# Patient Record
Sex: MALE | NOT HISPANIC OR LATINO | Employment: UNEMPLOYED | URBAN - METROPOLITAN AREA
[De-identification: names, ages, dates, MRNs, and addresses within clinical notes are randomized per-mention and may not be internally consistent; named-entity substitution may affect disease eponyms.]

---

## 2021-04-07 ENCOUNTER — IMMUNIZATION (OUTPATIENT)
Dept: VACCINE CLINIC | Facility: HOSPITAL | Age: 26
End: 2021-04-07

## 2021-04-07 PROCEDURE — 0001A: CPT | Performed by: OBSTETRICS & GYNECOLOGY

## 2021-04-07 PROCEDURE — 91300 HC SARSCOV02 VAC 30MCG/0.3ML IM: CPT | Performed by: OBSTETRICS & GYNECOLOGY

## 2021-04-28 ENCOUNTER — IMMUNIZATION (OUTPATIENT)
Dept: VACCINE CLINIC | Facility: HOSPITAL | Age: 26
End: 2021-04-28

## 2021-04-28 PROCEDURE — 0002A: CPT | Performed by: OBSTETRICS & GYNECOLOGY

## 2021-04-28 PROCEDURE — 91300 HC SARSCOV02 VAC 30MCG/0.3ML IM: CPT | Performed by: OBSTETRICS & GYNECOLOGY

## 2023-09-25 ENCOUNTER — TRANSCRIBE ORDERS (OUTPATIENT)
Dept: CT IMAGING | Facility: HOSPITAL | Age: 28
End: 2023-09-25

## 2023-09-25 DIAGNOSIS — R10.9 ABDOMINAL PAIN, ACUTE: Primary | ICD-10-CM

## 2023-10-06 ENCOUNTER — HOSPITAL ENCOUNTER (OUTPATIENT)
Dept: CT IMAGING | Facility: HOSPITAL | Age: 28
Discharge: HOME | End: 2023-10-06
Admitting: FAMILY MEDICINE
Payer: COMMERCIAL

## 2023-10-06 DIAGNOSIS — R10.9 ABDOMINAL PAIN, ACUTE: ICD-10-CM

## 2023-10-06 PROCEDURE — 74150 CT ABDOMEN W/O CONTRAST: CPT

## 2023-10-17 PROBLEM — R10.13 DYSPEPSIA: Status: ACTIVE | Noted: 2023-10-17

## 2023-10-18 ENCOUNTER — LAB (OUTPATIENT)
Dept: LAB | Facility: HOSPITAL | Age: 28
End: 2023-10-18
Payer: COMMERCIAL

## 2023-10-18 ENCOUNTER — PATIENT ROUNDING (BHMG ONLY) (OUTPATIENT)
Dept: GASTROENTEROLOGY | Facility: CLINIC | Age: 28
End: 2023-10-18
Payer: COMMERCIAL

## 2023-10-18 ENCOUNTER — OFFICE VISIT (OUTPATIENT)
Dept: GASTROENTEROLOGY | Facility: CLINIC | Age: 28
End: 2023-10-18
Payer: COMMERCIAL

## 2023-10-18 VITALS
BODY MASS INDEX: 44.1 KG/M2 | DIASTOLIC BLOOD PRESSURE: 90 MMHG | SYSTOLIC BLOOD PRESSURE: 130 MMHG | WEIGHT: 315 LBS | HEIGHT: 71 IN

## 2023-10-18 DIAGNOSIS — K58.0 IRRITABLE BOWEL SYNDROME WITH DIARRHEA: Primary | ICD-10-CM

## 2023-10-18 PROBLEM — R10.13 DYSPEPSIA: Status: RESOLVED | Noted: 2023-10-17 | Resolved: 2023-10-18

## 2023-10-18 LAB — CRP SERPL-MCNC: 1.21 MG/DL (ref 0–0.5)

## 2023-10-18 PROCEDURE — 36415 COLL VENOUS BLD VENIPUNCTURE: CPT | Performed by: STUDENT IN AN ORGANIZED HEALTH CARE EDUCATION/TRAINING PROGRAM

## 2023-10-18 PROCEDURE — 86364 TISS TRNSGLTMNASE EA IG CLAS: CPT | Performed by: STUDENT IN AN ORGANIZED HEALTH CARE EDUCATION/TRAINING PROGRAM

## 2023-10-18 PROCEDURE — 86258 DGP ANTIBODY EACH IG CLASS: CPT | Performed by: STUDENT IN AN ORGANIZED HEALTH CARE EDUCATION/TRAINING PROGRAM

## 2023-10-18 PROCEDURE — 86231 EMA EACH IG CLASS: CPT | Performed by: STUDENT IN AN ORGANIZED HEALTH CARE EDUCATION/TRAINING PROGRAM

## 2023-10-18 PROCEDURE — 82784 ASSAY IGA/IGD/IGG/IGM EACH: CPT | Performed by: STUDENT IN AN ORGANIZED HEALTH CARE EDUCATION/TRAINING PROGRAM

## 2023-10-18 PROCEDURE — 86140 C-REACTIVE PROTEIN: CPT | Performed by: STUDENT IN AN ORGANIZED HEALTH CARE EDUCATION/TRAINING PROGRAM

## 2023-10-18 NOTE — PROGRESS NOTES
Brandon Fang is a 28 y.o. male with a past medical history noted below who presents for evaluation of   Chief Complaint   Patient presents with    Abdominal Pain    Diarrhea       Subjective     # IBS-D   # Chronic Diarrhea    - Ongoing since 2017 intermittently but has become more persistent for the last 4 to 6 months. Further described as 3 to 5 loose bowel movements per day that tend to trigger by meals. He also attributes increased anxiety to the increased frequency of his bowel movements. Reports no specific food triggers.   - Associated with abdominal cramping/bloating. Reports no relief with Bentyl previously.   - Denies overt bleeding. Denies family history of IBD or celiac disease.   - Denies history of CCY or previous acute pancreatitis.   - CT A/P on 10/6 showed no acute abnormalities.               History reviewed. No pertinent past medical history.      Current Outpatient Medications:     cetirizine (zyrTEC) 10 MG tablet, Take 1 tablet by mouth Daily., Disp: , Rfl:     Lactobacillus (ACIDOPHILUS PO), Take 1 tablet by mouth Daily., Disp: , Rfl:     riFAXIMin (XIFAXAN) 550 MG tablet, Take 1 tablet by mouth Every 8 (Eight) Hours for 42 days., Disp: 42 tablet, Rfl: 2    Allergies   Allergen Reactions    Benadryl [Diphenhydramine] Other (See Comments)     Made him extremely hyper        Social History     Socioeconomic History    Marital status: Single   Tobacco Use    Smoking status: Former     Packs/day: 0.20     Years: 5.00     Additional pack years: 0.00     Total pack years: 1.00     Types: Cigarettes     Passive exposure: Never    Smokeless tobacco: Never    Tobacco comments:     N/A   Vaping Use    Vaping Use: Every day    Substances: Nicotine   Substance and Sexual Activity    Alcohol use: Yes     Comment: occassionally    Drug use: Never    Sexual activity: Defer       History reviewed. No pertinent family history.    Objective     Vitals:    10/18/23 1538   BP: 130/90          "10/18/23  1538   Weight: (!) 157 kg (345 lb 9.6 oz)     Body mass index is 48.2 kg/m².    Physical Exam  Constitutional:       Appearance: Normal appearance.   HENT:      Head: Normocephalic and atraumatic.   Eyes:      Extraocular Movements: Extraocular movements intact.      Pupils: Pupils are equal, round, and reactive to light.   Cardiovascular:      Heart sounds: Normal heart sounds.   Pulmonary:      Effort: Pulmonary effort is normal.      Breath sounds: Normal breath sounds.   Abdominal:      General: Abdomen is flat. Bowel sounds are normal. There is no distension.      Palpations: Abdomen is soft.      Tenderness: There is no abdominal tenderness.   Neurological:      Mental Status: He is alert.   Psychiatric:         Mood and Affect: Mood normal.         Behavior: Behavior normal.         No results found for: \"WBC\", \"RBC\", \"HGB\", \"HCT\", \"MCV\", \"MCH\", \"MCHC\", \"RDW\", \"RDWSD\", \"MPV\", \"PLT\", \"NEUTRORELPCT\", \"LYMPHORELPCT\", \"MONORELPCT\", \"EOSRELPCT\", \"BASORELPCT\", \"AUTOIGPER\", \"NEUTROABS\", \"LYMPHSABS\", \"MONOSABS\", \"EOSABS\", \"BASOSABS\", \"AUTOIGNUM\", \"NRBC\"    No results found for: \"GLUCOSE\", \"NA\", \"K\", \"CO2\", \"CL\", \"ANIONGAP\", \"CREATININE\", \"BUN\", \"BCR\", \"CALCIUM\", \"EGFRIFNONA\", \"ALKPHOS\", \"PROTEINTOT\", \"ALT\", \"AST\", \"BILITOT\", \"ALBUMIN\", \"GLOB\", \"LABIL2\"      Imaging Results (Last 7 Days)       ** No results found for the last 168 hours. **                   Assessment & Plan     Diagnoses and all orders for this visit:    1. Irritable bowel syndrome with diarrhea (Primary)  Assessment & Plan:  - Start 2 week course of Rifaximin 550 mg TID   - Obtain fecal calprotecin, CRP, & celiac panel to further evaluate     Orders:  -     Celiac Comprehensive Panel  -     C-reactive Protein  -     Calprotectin, Fecal - Stool, Per Rectum  -     Discontinue: riFAXIMin (XIFAXAN) 550 MG tablet; Take 1 tablet by mouth Every 8 (Eight) Hours for 42 days.  Dispense: 42 tablet; Refill: 2  -     riFAXIMin (XIFAXAN) 550 MG tablet; " Take 1 tablet by mouth Every 8 (Eight) Hours for 42 days.  Dispense: 42 tablet; Refill: 2        I have discussed the above plan with the patient.  They verbalize understanding and are in agreement with the plan.  They have been advised to contact the office for any questions, concerns, or changes related to their health.

## 2023-10-19 ENCOUNTER — LAB (OUTPATIENT)
Dept: LAB | Facility: HOSPITAL | Age: 28
End: 2023-10-19
Payer: COMMERCIAL

## 2023-10-19 PROCEDURE — 83993 ASSAY FOR CALPROTECTIN FECAL: CPT | Performed by: STUDENT IN AN ORGANIZED HEALTH CARE EDUCATION/TRAINING PROGRAM

## 2023-10-19 NOTE — ASSESSMENT & PLAN NOTE
- Start 2 week course of Rifaximin 550 mg TID   - Obtain fecal calprotecin, CRP, & celiac panel to further evaluate

## 2023-10-20 LAB
ENDOMYSIUM IGA SER QL: NEGATIVE
GLIADIN PEPTIDE IGA SER-ACNC: 7 UNITS (ref 0–19)
GLIADIN PEPTIDE IGG SER-ACNC: 2 UNITS (ref 0–19)
IGA SERPL-MCNC: 249 MG/DL (ref 90–386)
TTG IGA SER-ACNC: <2 U/ML (ref 0–3)
TTG IGG SER-ACNC: 5 U/ML (ref 0–5)

## 2023-10-23 NOTE — PROGRESS NOTES
- CRP and celiac panel obtained for chronic diarrhea.   - CRP mildly elevated.   - Celiac panel was negative.   - POC: fecal calprotectin which is more sensitive to evaluate for IBD than the CRP level. Suspect etiology is IBS-D. Started on 2 week course of Rifaximin to treat for IBS-D.

## 2023-10-24 LAB — CALPROTECTIN STL-MCNT: 8 UG/G (ref 0–120)

## 2023-11-03 NOTE — PROGRESS NOTES
[facility]  [address]  [facility] [address] [phone].    Before we get started may I verify your date of birth? 1995    I am calling to officially welcome you to our practice and ask about your recent visit. Is this a good time to talk? Yes    Tell me about your visit with us. What things went well?  Was late, but still got in to see MD.        We're always looking for ways to make our patients' experiences even better. Do you have recommendations on ways we may improve?  no    Overall were you satisfied with your first visit to our practice? yes       I appreciate you taking the time to speak with me today. Is there anything else I can do for you? no      Thank you, and have a great day.

## 2024-01-12 ENCOUNTER — OFFICE VISIT (OUTPATIENT)
Dept: GASTROENTEROLOGY | Facility: CLINIC | Age: 29
End: 2024-01-12
Payer: COMMERCIAL

## 2024-01-12 VITALS — DIASTOLIC BLOOD PRESSURE: 88 MMHG | BODY MASS INDEX: 50.93 KG/M2 | HEIGHT: 71 IN | SYSTOLIC BLOOD PRESSURE: 138 MMHG

## 2024-01-12 DIAGNOSIS — K58.0 IRRITABLE BOWEL SYNDROME WITH DIARRHEA: Primary | ICD-10-CM

## 2024-01-12 NOTE — PROGRESS NOTES
Brandon Fang is a 28 y.o. male with PMH of IBS-D who presents with   Chief Complaint   Patient presents with    Follow-up     IBS-D       Subjective     # IBS-D   - Following his last appointment in 10/2023, treated with a 2 week course of Rifaximin 550 mg TID with subsequent resolution of his diarrhea. Also reports abdominal cramping has significantly improved.   - Adherent to daily Metamucil and daily Lactobacillus.   - Had negative celiac panel and fecal calprotectin.   - Denies history of CCY or previous acute pancreatitis.   - CT A/P on 10/6 showed no acute abnormalities.             History reviewed. No pertinent past medical history.    Social History     Socioeconomic History    Marital status: Single   Tobacco Use    Smoking status: Former     Packs/day: 0.20     Years: 5.00     Additional pack years: 0.00     Total pack years: 1.00     Types: Cigarettes     Passive exposure: Never    Smokeless tobacco: Never    Tobacco comments:     N/A   Vaping Use    Vaping Use: Every day    Substances: Nicotine   Substance and Sexual Activity    Alcohol use: Yes     Comment: occassionally    Drug use: Never    Sexual activity: Defer         Current Outpatient Medications:     cetirizine (zyrTEC) 10 MG tablet, Take 1 tablet by mouth Daily., Disp: , Rfl:     Lactobacillus (ACIDOPHILUS PO), Take 1 tablet by mouth Daily., Disp: , Rfl:     Objective   Vitals:    01/12/24 1427   BP: 138/88     There were no vitals filed for this visit.  Body mass index is 50.93 kg/m².      Physical Exam  Constitutional:       Appearance: Normal appearance.   HENT:      Head: Normocephalic and atraumatic.   Eyes:      Extraocular Movements: Extraocular movements intact.      Conjunctiva/sclera: Conjunctivae normal.   Cardiovascular:      Rate and Rhythm: Normal rate and regular rhythm.      Heart sounds: Normal heart sounds.   Pulmonary:      Effort: Pulmonary effort is normal.      Breath sounds: Normal breath sounds.   Abdominal:     "  General: Abdomen is flat. Bowel sounds are normal. There is no distension.      Palpations: Abdomen is soft.      Tenderness: There is no abdominal tenderness.   Neurological:      Mental Status: He is alert.   Psychiatric:         Mood and Affect: Mood normal.         Behavior: Behavior normal.         No results found for: \"WBC\", \"RBC\", \"HGB\", \"HCT\", \"MCV\", \"MCH\", \"MCHC\", \"RDW\", \"RDWSD\", \"MPV\", \"PLT\", \"NEUTRORELPCT\", \"LYMPHORELPCT\", \"MONORELPCT\", \"EOSRELPCT\", \"BASORELPCT\", \"AUTOIGPER\", \"NEUTROABS\", \"LYMPHSABS\", \"MONOSABS\", \"EOSABS\", \"BASOSABS\", \"AUTOIGNUM\", \"NRBC\"    No results found for: \"GLUCOSE\", \"BUN\", \"CREATININE\", \"EGFRIFNONA\", \"EGFRIFAFRI\", \"BCR\", \"POTASSIUM\", \"CO2\", \"CALCIUM\", \"PROTENTOTREF\", \"ALBUMIN\", \"LABIL2\", \"BILIRUBIN\", \"AST\", \"ALT\"      Imaging Results (Last 7 Days)       ** No results found for the last 168 hours. **              Assessment & Plan   Diagnoses and all orders for this visit:    1. Irritable bowel syndrome with diarrhea (Primary)  Assessment & Plan:  - Treated with a 2 week course of Rifaximin with resolution of diarrhea   - Continue daily Metamucil and daily Lactobacillus       RTC in 6 months     I have discussed the above plan with the patient.  They verbalize understanding and are in agreement with the plan.  They have been advised to contact the office for any questions, concerns, or changes related to their health.      "

## 2024-01-12 NOTE — ASSESSMENT & PLAN NOTE
- Treated with a 2 week course of Rifaximin with resolution of diarrhea   - Continue daily Metamucil and daily Lactobacillus

## 2025-02-16 ENCOUNTER — HOSPITAL ENCOUNTER (EMERGENCY)
Facility: HOSPITAL | Age: 30
Discharge: HOME | End: 2025-02-16
Attending: STUDENT IN AN ORGANIZED HEALTH CARE EDUCATION/TRAINING PROGRAM | Admitting: STUDENT IN AN ORGANIZED HEALTH CARE EDUCATION/TRAINING PROGRAM
Payer: COMMERCIAL

## 2025-02-16 VITALS
SYSTOLIC BLOOD PRESSURE: 99 MMHG | HEART RATE: 73 BPM | BODY MASS INDEX: 36.4 KG/M2 | RESPIRATION RATE: 16 BRPM | HEIGHT: 71 IN | TEMPERATURE: 98.5 F | DIASTOLIC BLOOD PRESSURE: 70 MMHG | WEIGHT: 260 LBS | OXYGEN SATURATION: 97 %

## 2025-02-16 DIAGNOSIS — K64.4 EXTERNAL HEMORRHOID: Primary | ICD-10-CM

## 2025-02-16 PROCEDURE — 99282 EMERGENCY DEPT VISIT SF MDM: CPT | Performed by: STUDENT IN AN ORGANIZED HEALTH CARE EDUCATION/TRAINING PROGRAM

## 2025-02-16 RX ORDER — NITROGLYCERIN 4 MG/G
1 OINTMENT RECTAL 2 TIMES DAILY PRN
Qty: 30 G | Refills: 0 | Status: SHIPPED | OUTPATIENT
Start: 2025-02-16

## 2025-02-16 RX ORDER — SULFAMETHOXAZOLE AND TRIMETHOPRIM 800; 160 MG/1; MG/1
1 TABLET ORAL 2 TIMES DAILY
Qty: 10 TABLET | Refills: 0 | Status: SHIPPED | OUTPATIENT
Start: 2025-02-16 | End: 2025-02-21

## 2025-02-16 NOTE — Clinical Note
[facility] EMERGENCY DEPARTMENT  [address]  [place] [address]  Phone: [phone]    Brandon Fang was seen and treated in our emergency department on 2/16/2025.  He may return to work on 02/18/2025.         Thank you for choosing [facility].    Isma Watters MD

## 2025-02-16 NOTE — DISCHARGE INSTRUCTIONS
Continue Metamucil and consider adding MiraLAX and or Dulcolax for relief of constipation, follow-up with GI for management of external hemorrhoids

## 2025-02-16 NOTE — Clinical Note
[facility] EMERGENCY DEPARTMENT  [address]  [place] [address]  Phone: [phone]    Brandon Fang was seen and treated in our emergency department on 2/16/2025.  He may return to work on 02/18/2025.         Thank you for choosing [facility].    Isma Watters MD

## 2025-02-16 NOTE — ED PROVIDER NOTES
Subjective   History of Present Illness  29-year-old male with past medical history of external hemorrhoids has had GI follow-up to be evaluated for colitis presenting with complaint of external hemorrhoid pain.  He states that the pain has been present for 1 week, he has tried lidocaine and a anesthetic cream available over-the-counter.  Patient states that he has not had any relief.  He has not seen a doctor for this pain yet.  Fever no chills, he reports significant weight loss of approximately 100 pounds over the past year that was self-directed and intentional.  He does not have any fever, chills, any body aches, no bloody diarrhea no abdominal pain no nausea no vomiting        Review of Systems    History reviewed. No pertinent past medical history.    Allergies   Allergen Reactions    Benadryl [Diphenhydramine] Other (See Comments)     Made him extremely hyper        History reviewed. No pertinent surgical history.    History reviewed. No pertinent family history.    Social History     Socioeconomic History    Marital status: Single   Tobacco Use    Smoking status: Former     Current packs/day: 0.20     Average packs/day: 0.2 packs/day for 5.0 years (1.0 ttl pk-yrs)     Types: Cigarettes     Passive exposure: Never    Smokeless tobacco: Former    Tobacco comments:     N/A   Vaping Use    Vaping status: Former    Devices: Pre-filled or refillable cartridge   Substance and Sexual Activity    Alcohol use: Yes     Comment: occassionally    Drug use: Never    Sexual activity: Not Currently           Objective   Physical Exam  Vitals and nursing note reviewed. Exam conducted with a chaperone present.   Constitutional:       General: He is not in acute distress.     Appearance: Normal appearance. He is not ill-appearing, toxic-appearing or diaphoretic.   HENT:      Head: Normocephalic and atraumatic.      Nose: Nose normal.      Mouth/Throat:      Pharynx: No oropharyngeal exudate or posterior oropharyngeal  erythema.   Eyes:      Extraocular Movements: Extraocular movements intact.      Conjunctiva/sclera: Conjunctivae normal.      Pupils: Pupils are equal, round, and reactive to light.   Cardiovascular:      Rate and Rhythm: Normal rate and regular rhythm.   Pulmonary:      Effort: Pulmonary effort is normal. No respiratory distress.      Breath sounds: Normal breath sounds. No stridor. No wheezing, rhonchi or rales.   Abdominal:      General: Abdomen is flat. There is no distension.      Tenderness: There is no abdominal tenderness. There is no guarding or rebound.   Genitourinary:     Comments: Multiple perianal skin tags, 1 active external hemorrhoid no surrounding erythema, edema  Musculoskeletal:         General: No swelling, tenderness, deformity or signs of injury. Normal range of motion.      Cervical back: Normal range of motion.   Skin:     General: Skin is warm and dry.      Capillary Refill: Capillary refill takes less than 2 seconds.      Findings: No erythema or rash.   Neurological:      General: No focal deficit present.      Mental Status: He is alert and oriented to person, place, and time. Mental status is at baseline.      Cranial Nerves: No cranial nerve deficit.      Sensory: No sensory deficit.      Motor: No weakness.   Psychiatric:         Mood and Affect: Mood normal.         Procedures           ED Course                                                       Medical Decision Making  Patient here for an external hemorrhoid, c given duration of symptoms likely was thrombosed however patient likely in the resolution phase of the external hemorrhoid.  No fever no chills no surrounding erythema or edema suggestive of an abscess.  The hemorrhoid was not fluctuant to suggest an abscess.  There is no surrounding edema or erythema.  Patient does report mild intermittent bleeding but no pus or serosanguineous drainage    Problems Addressed:  External hemorrhoid: complicated acute illness or  injury    Risk  Prescription drug management.        Final diagnoses:   External hemorrhoid       ED Disposition  ED Disposition       ED Disposition   Discharge    Condition   Stable    Comment   --               Keyshawn Garrison MD  [address]  [facility] [id number] [phone]    In 2 days      [facility] EMERGENCY DEPARTMENT  [address] [facility] [id number] [phone]  Go to   If symptoms worsen    Farhan Taylor MD  [address] [facility] [address]  [facility] [id number] [phone]               Medication List        New Prescriptions      Lidocaine-Hydrocortisone Ace 5-1 % cream  Apply 1 dose topically 2 (Two) Times a Day.     Nitroglycerin 0.4 % ointment  Insert 1 Application into the rectum 2 (Two) Times a Day As Needed (hemorrhoid pain).               Where to Get Your Medications        These medications were sent to [facility] [id number] - [place] [address] - [phone] [facility] [phone] [address] [address]      Phone: [phone]   Lidocaine-Hydrocortisone Ace 5-1 % cream  Nitroglycerin 0.4 % ointment            Isma Watters MD  02/16/25 5995

## 2025-02-16 NOTE — Clinical Note
[facility] EMERGENCY DEPARTMENT  [address]  [place] [address]  Phone: [phone]    Brandon Fang was seen and treated in our emergency department on 2/16/2025.  He may return to work on 02/18/2025.         Thank you for choosing [facility].    Isma Watters MD

## 2025-02-19 ENCOUNTER — HOSPITAL ENCOUNTER (OUTPATIENT)
Facility: HOSPITAL | Age: 30
Setting detail: HOSPITAL OUTPATIENT SURGERY
Discharge: HOME | End: 2025-02-19
Attending: SURGERY | Admitting: SURGERY
Payer: COMMERCIAL

## 2025-02-19 ENCOUNTER — ANESTHESIA EVENT (OUTPATIENT)
Dept: PERIOP | Facility: HOSPITAL | Age: 30
End: 2025-02-19
Payer: COMMERCIAL

## 2025-02-19 ENCOUNTER — OFFICE VISIT (OUTPATIENT)
Dept: SURGERY | Facility: CLINIC | Age: 30
End: 2025-02-19
Payer: COMMERCIAL

## 2025-02-19 ENCOUNTER — ANESTHESIA (OUTPATIENT)
Dept: PERIOP | Facility: HOSPITAL | Age: 30
End: 2025-02-19
Payer: COMMERCIAL

## 2025-02-19 VITALS
RESPIRATION RATE: 12 BRPM | BODY MASS INDEX: 36.37 KG/M2 | WEIGHT: 260.8 LBS | TEMPERATURE: 98.4 F | SYSTOLIC BLOOD PRESSURE: 110 MMHG | OXYGEN SATURATION: 95 % | HEART RATE: 81 BPM | DIASTOLIC BLOOD PRESSURE: 65 MMHG

## 2025-02-19 VITALS
BODY MASS INDEX: 36.54 KG/M2 | DIASTOLIC BLOOD PRESSURE: 88 MMHG | WEIGHT: 261 LBS | SYSTOLIC BLOOD PRESSURE: 122 MMHG | HEIGHT: 71 IN

## 2025-02-19 DIAGNOSIS — K61.1 PERIRECTAL ABSCESS: Primary | ICD-10-CM

## 2025-02-19 DIAGNOSIS — K61.1 PERIRECTAL ABSCESS: ICD-10-CM

## 2025-02-19 LAB
DEPRECATED RDW RBC AUTO: 45.1 FL (ref 37–54)
ERYTHROCYTE [DISTWIDTH] IN BLOOD BY AUTOMATED COUNT: 12.6 % (ref 12.3–15.4)
HCT VFR BLD AUTO: 41.8 % (ref 37.5–51)
HGB BLD-MCNC: 13.6 G/DL (ref 13–17.7)
MCH RBC QN AUTO: 31.3 PG (ref 26.6–33)
MCHC RBC AUTO-ENTMCNC: 32.5 G/DL (ref 31.5–35.7)
MCV RBC AUTO: 96.3 FL (ref 79–97)
PLATELET # BLD AUTO: 241 10*3/MM3 (ref 140–450)
PMV BLD AUTO: 10 FL (ref 6–12)
RBC # BLD AUTO: 4.34 10*6/MM3 (ref 4.14–5.8)
WBC NRBC COR # BLD AUTO: 9.5 10*3/MM3 (ref 3.4–10.8)

## 2025-02-19 PROCEDURE — 87147 CULTURE TYPE IMMUNOLOGIC: CPT | Performed by: SURGERY

## 2025-02-19 PROCEDURE — 25010000002 DEXAMETHASONE PER 1 MG: Performed by: NURSE ANESTHETIST, CERTIFIED REGISTERED

## 2025-02-19 PROCEDURE — 25010000002 MIDAZOLAM PER 1MG: Performed by: NURSE ANESTHETIST, CERTIFIED REGISTERED

## 2025-02-19 PROCEDURE — 25810000003 LACTATED RINGERS PER 1000 ML: Performed by: NURSE ANESTHETIST, CERTIFIED REGISTERED

## 2025-02-19 PROCEDURE — 25010000002 PROPOFOL 200 MG/20ML EMULSION: Performed by: NURSE ANESTHETIST, CERTIFIED REGISTERED

## 2025-02-19 PROCEDURE — 85027 COMPLETE CBC AUTOMATED: CPT | Performed by: NURSE ANESTHETIST, CERTIFIED REGISTERED

## 2025-02-19 PROCEDURE — 25010000002 CHLOROPROCAINE HCL (PF) 3 % SOLUTION: Performed by: NURSE ANESTHETIST, CERTIFIED REGISTERED

## 2025-02-19 PROCEDURE — 87075 CULTR BACTERIA EXCEPT BLOOD: CPT | Performed by: SURGERY

## 2025-02-19 PROCEDURE — 25010000002 ONDANSETRON PER 1 MG: Performed by: NURSE ANESTHETIST, CERTIFIED REGISTERED

## 2025-02-19 PROCEDURE — 46060 I&D ISCHIORECTAL/NTRMRL ABSC: CPT | Performed by: SURGERY

## 2025-02-19 PROCEDURE — 25010000002 BUPIVACAINE LIPOSOME 1.3 % SUSPENSION 20 ML VIAL: Performed by: SURGERY

## 2025-02-19 PROCEDURE — 25010000002 LIDOCAINE 2% SOLUTION: Performed by: NURSE ANESTHETIST, CERTIFIED REGISTERED

## 2025-02-19 PROCEDURE — 25010000002 CEFOXITIN PER 1 G: Performed by: SURGERY

## 2025-02-19 PROCEDURE — 87205 SMEAR GRAM STAIN: CPT | Performed by: SURGERY

## 2025-02-19 PROCEDURE — 87070 CULTURE OTHR SPECIMN AEROBIC: CPT | Performed by: SURGERY

## 2025-02-19 RX ORDER — FAMOTIDINE 10 MG/ML
20 INJECTION, SOLUTION INTRAVENOUS
Status: COMPLETED | OUTPATIENT
Start: 2025-02-19 | End: 2025-02-19

## 2025-02-19 RX ORDER — HYDROCODONE BITARTRATE AND ACETAMINOPHEN 5; 325 MG/1; MG/1
1 TABLET ORAL ONCE AS NEEDED
Status: DISCONTINUED | OUTPATIENT
Start: 2025-02-19 | End: 2025-02-19 | Stop reason: HOSPADM

## 2025-02-19 RX ORDER — ONDANSETRON 2 MG/ML
4 INJECTION INTRAMUSCULAR; INTRAVENOUS ONCE AS NEEDED
Status: DISCONTINUED | OUTPATIENT
Start: 2025-02-19 | End: 2025-02-19 | Stop reason: HOSPADM

## 2025-02-19 RX ORDER — CHLOROPROCAINE HYDROCHLORIDE 30 MG/ML
INJECTION, SOLUTION EPIDURAL; INFILTRATION; INTRACAUDAL; PERINEURAL AS NEEDED
Status: DISCONTINUED | OUTPATIENT
Start: 2025-02-19 | End: 2025-02-19 | Stop reason: SURG

## 2025-02-19 RX ORDER — LIDOCAINE HYDROCHLORIDE 20 MG/ML
INJECTION, SOLUTION INFILTRATION; PERINEURAL AS NEEDED
Status: DISCONTINUED | OUTPATIENT
Start: 2025-02-19 | End: 2025-02-19 | Stop reason: SURG

## 2025-02-19 RX ORDER — LIDOCAINE HYDROCHLORIDE 10 MG/ML
0.5 INJECTION, SOLUTION EPIDURAL; INFILTRATION; INTRACAUDAL; PERINEURAL ONCE AS NEEDED
Status: DISCONTINUED | OUTPATIENT
Start: 2025-02-19 | End: 2025-02-19 | Stop reason: HOSPADM

## 2025-02-19 RX ORDER — MIDAZOLAM HYDROCHLORIDE 2 MG/2ML
INJECTION, SOLUTION INTRAMUSCULAR; INTRAVENOUS AS NEEDED
Status: DISCONTINUED | OUTPATIENT
Start: 2025-02-19 | End: 2025-02-19 | Stop reason: SURG

## 2025-02-19 RX ORDER — SODIUM CHLORIDE 9 MG/ML
40 INJECTION, SOLUTION INTRAVENOUS AS NEEDED
Status: DISCONTINUED | OUTPATIENT
Start: 2025-02-19 | End: 2025-02-19 | Stop reason: HOSPADM

## 2025-02-19 RX ORDER — SODIUM CHLORIDE 0.9 % (FLUSH) 0.9 %
10 SYRINGE (ML) INJECTION AS NEEDED
Status: DISCONTINUED | OUTPATIENT
Start: 2025-02-19 | End: 2025-02-19 | Stop reason: HOSPADM

## 2025-02-19 RX ORDER — FENTANYL CITRATE 50 UG/ML
25 INJECTION, SOLUTION INTRAMUSCULAR; INTRAVENOUS
Status: DISCONTINUED | OUTPATIENT
Start: 2025-02-19 | End: 2025-02-19 | Stop reason: HOSPADM

## 2025-02-19 RX ORDER — PROPOFOL 10 MG/ML
INJECTION, EMULSION INTRAVENOUS AS NEEDED
Status: DISCONTINUED | OUTPATIENT
Start: 2025-02-19 | End: 2025-02-19 | Stop reason: SURG

## 2025-02-19 RX ORDER — MIDAZOLAM HYDROCHLORIDE 2 MG/2ML
1 INJECTION, SOLUTION INTRAMUSCULAR; INTRAVENOUS
Status: DISCONTINUED | OUTPATIENT
Start: 2025-02-19 | End: 2025-02-19 | Stop reason: HOSPADM

## 2025-02-19 RX ORDER — OXYCODONE HYDROCHLORIDE 5 MG/1
5 TABLET ORAL EVERY 4 HOURS PRN
Qty: 30 TABLET | Refills: 0 | Status: SHIPPED | OUTPATIENT
Start: 2025-02-19 | End: 2025-03-05

## 2025-02-19 RX ORDER — ONDANSETRON 2 MG/ML
4 INJECTION INTRAMUSCULAR; INTRAVENOUS ONCE AS NEEDED
Status: COMPLETED | OUTPATIENT
Start: 2025-02-19 | End: 2025-02-19

## 2025-02-19 RX ORDER — DIAZEPAM 5 MG/1
5 TABLET ORAL EVERY 8 HOURS PRN
Qty: 15 TABLET | Refills: 0 | Status: SHIPPED | OUTPATIENT
Start: 2025-02-19 | End: 2025-03-05

## 2025-02-19 RX ORDER — DEXAMETHASONE SODIUM PHOSPHATE 4 MG/ML
8 INJECTION, SOLUTION INTRA-ARTICULAR; INTRALESIONAL; INTRAMUSCULAR; INTRAVENOUS; SOFT TISSUE ONCE AS NEEDED
Status: COMPLETED | OUTPATIENT
Start: 2025-02-19 | End: 2025-02-19

## 2025-02-19 RX ORDER — SODIUM CHLORIDE, SODIUM LACTATE, POTASSIUM CHLORIDE, CALCIUM CHLORIDE 600; 310; 30; 20 MG/100ML; MG/100ML; MG/100ML; MG/100ML
9 INJECTION, SOLUTION INTRAVENOUS CONTINUOUS
Status: DISCONTINUED | OUTPATIENT
Start: 2025-02-19 | End: 2025-02-19 | Stop reason: HOSPADM

## 2025-02-19 RX ORDER — SODIUM CHLORIDE 0.9 % (FLUSH) 0.9 %
10 SYRINGE (ML) INJECTION EVERY 12 HOURS SCHEDULED
Status: DISCONTINUED | OUTPATIENT
Start: 2025-02-19 | End: 2025-02-19 | Stop reason: HOSPADM

## 2025-02-19 RX ADMIN — MIDAZOLAM HYDROCHLORIDE 1 MG: 1 INJECTION, SOLUTION INTRAMUSCULAR; INTRAVENOUS at 11:18

## 2025-02-19 RX ADMIN — SODIUM CHLORIDE, POTASSIUM CHLORIDE, SODIUM LACTATE AND CALCIUM CHLORIDE 9 ML/HR: 600; 310; 30; 20 INJECTION, SOLUTION INTRAVENOUS at 09:20

## 2025-02-19 RX ADMIN — CEFOXITIN 2 G: 2 INJECTION, POWDER, FOR SOLUTION INTRAVENOUS at 11:34

## 2025-02-19 RX ADMIN — PROPOFOL 20 MG: 10 INJECTION, EMULSION INTRAVENOUS at 11:35

## 2025-02-19 RX ADMIN — DEXAMETHASONE SODIUM PHOSPHATE 8 MG: 4 INJECTION, SOLUTION INTRAMUSCULAR; INTRAVENOUS at 09:28

## 2025-02-19 RX ADMIN — PROPOFOL 20 MG: 10 INJECTION, EMULSION INTRAVENOUS at 11:40

## 2025-02-19 RX ADMIN — LIDOCAINE HYDROCHLORIDE 60 MG: 20 INJECTION, SOLUTION INFILTRATION; PERINEURAL at 11:35

## 2025-02-19 RX ADMIN — ONDANSETRON 4 MG: 2 INJECTION INTRAMUSCULAR; INTRAVENOUS at 09:28

## 2025-02-19 RX ADMIN — MIDAZOLAM HYDROCHLORIDE 2 MG: 1 INJECTION, SOLUTION INTRAMUSCULAR; INTRAVENOUS at 11:35

## 2025-02-19 RX ADMIN — PROPOFOL 40 MG: 10 INJECTION, EMULSION INTRAVENOUS at 11:45

## 2025-02-19 RX ADMIN — CHLOROPROCAINE HYDROCHLORIDE 2 ML: 30 INJECTION, SOLUTION EPIDURAL; INFILTRATION; INTRACAUDAL; PERINEURAL at 11:29

## 2025-02-19 RX ADMIN — FAMOTIDINE 20 MG: 10 INJECTION INTRAVENOUS at 09:28

## 2025-02-19 NOTE — ANESTHESIA PROCEDURE NOTES
Spinal Block    Pre-sedation assessment completed: 2/19/2025 11:27 AM    Patient reassessed immediately prior to procedure    Start Time: 2/19/2025 11:28 AM  Stop Time: 2/19/2025 11:29 AM  Indication:at surgeon's request and post-op pain management  Performed By  CRNA/CAA: Garland Diane CRNA  Preanesthetic Checklist  Completed: patient identified, IV checked, site marked, risks and benefits discussed, surgical consent, monitors and equipment checked, pre-op evaluation and timeout performed  Spinal Block Prep:  Patient Position:sitting  Sterile Tech:cap, gloves, mask and sterile barriers  Prep:Chloraprep  Patient Monitoring:blood pressure monitoring, continuous pulse oximetry and EKG    Spinal Block Procedure  Approach:midline  Guidance:landmark technique and palpation technique  Location:L3-L4  Needle Type:Sprotte  Needle Gauge:25 G  Placement of Spinal needle event:cerebrospinal fluid aspirated  Paresthesia: no  Fluid Appearance:clear     Post Assessment  Patient Tolerance:patient tolerated the procedure well with no apparent complications  Complications no

## 2025-02-19 NOTE — H&P (VIEW-ONLY)
Colorectal & General Surgery  Consultation    Patient: Brandon Fang  YOB: 1995  MRN: [id number]      Assessment  Brandon Fang is a 29 y.o. male with anterior perirectal abscess.  We discussed proceeding the operating room for incision and drainage.  We also discussed the risk that he could have a fistula at this location which would require fistulotomy or seton placement.  We discussed the risk, benefits, alternatives to procedure.  Informed consent was obtained.  Plan to proceed the operating today for incision and drainage of perirectal abscess.    He is currently on Bactrim and we will continue that unless operative cultures indicate better sensitivity.    Referring Provider: ADRIANNE Garrison MD    Reason for Consultation: Perirectal abscess    History of Present Illness   Brandon Fang is a 29 y.o. male who presents to the office with a perirectal abscess.  He says been present since late last week.  He went to the urgent care and then to the emergency department and unfortunately did not receive incision and drainage at that time.  Today, he complains of significant pain.  No drainage.  He did wake up and sweats last night.    Most recent colonoscopy: None    Past Medical History   Past Medical History:   Diagnosis Date    History of MRSA infection         Past Surgical History   Past Surgical History:   Procedure Laterality Date    ABSCESS DRAINAGE N/A     left flank       Social History  Social History     Socioeconomic History    Marital status: Single   Tobacco Use    Smoking status: Former     Current packs/day: 0.20     Average packs/day: 0.2 packs/day for 5.0 years (1.0 ttl pk-yrs)     Types: Cigarettes     Passive exposure: Never    Smokeless tobacco: Former    Tobacco comments:     N/A   Vaping Use    Vaping status: Former    Devices: Pre-filled or refillable cartridge   Substance and Sexual Activity    Alcohol use: Yes     Comment: occassionally    Drug use: Never     Sexual activity: Not Currently       Family History  Family History   Problem Relation Age of Onset    Heart disease Mother        Colorectal cancer family history: None    Review of Systems  Negative except as documented in the HPI.     Allergies  Allergies   Allergen Reactions    Benadryl [Diphenhydramine] Other (See Comments)     Made him extremely hyper        Medications    Current Outpatient Medications:     Lactobacillus (ACIDOPHILUS PO), Take 1 tablet by mouth Daily., Disp: , Rfl:     Nitroglycerin 0.4 % ointment, Insert 1 Application into the rectum 2 (Two) Times a Day As Needed (hemorrhoid pain)., Disp: 30 g, Rfl: 0    sulfamethoxazole-trimethoprim (BACTRIM DS,SEPTRA DS) 800-160 MG per tablet, Take 1 tablet by mouth 2 (Two) Times a Day for 5 days., Disp: 10 tablet, Rfl: 0    Vital Signs  Vitals:    02/19/25 0812   BP: 122/88        Physical Exam  Constitutional: Resting comfortably, no acute distress  Neck: Supple, trachea midline  Respiratory: No increased work of breathing, Symmetric excursion  Cardiovascular: Well pefursed, no jugular venous distention evident   Abdominal:  Soft, non-tender, non-distended  Lymphatics: No cervical or suprascapular adenopathy  Skin: Warm, dry, no rash on visualized skin surfaces  Musculoskeletal: Symmetric strength, no obvious gross abnormalities  Psychiatric: Alert and oriented ×3, normal affect   Perirectal.  Perirectal abscess in the anterior position.         Greyson Macedo MD  Colorectal & General Surgery  [facility]    [address], [address]  [place], [place], [address]  P: [phone]  F: [phone]

## 2025-02-19 NOTE — OP NOTE
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  Obvious fistulous connection to the anterior midline of the anal canal.  Involved approximately 8 mm of internal anal sphincter muscle that was divided.    SURGEON:  Greyson Macedo MD    ASSISTANT:  None    ANESTHESIA:  Monitored anesthesia care with spinal anesthesia    EBL:  15 mL    SPECIMEN:  Peritoneal abscess culture    OPERATIVE DESCRIPTION:  The patient was brought to the operating room under the care of the nursing staff.  The patient was placed on the operating room table in the prone position where anesthesia was induced.  The patient was then prepped and positioned in the usual sterile fashion.  A standardized timeout was then performed.    External examination demonstrated large fluctuant area in the anterior midline within his perineum.  Digital rectal exam was normal.  This area of fluctuance was incised near the anal canal.  There was a large rush of approximately 150 mL of purulence.  Culture was obtained.  Abscess cavity was copiously irrigated and debrided.  There was an obvious fistulous connection to the anterior midline portion of the anal canal.  A probe was inserted that confirmed to this.  Approximately 8 mm of internal anal sphincter muscle was involved and decision was made to perform fistulotomy.  Hemostasis was obtained.  Wound was packed with Betadine soaked Kerlix.  ABD and mesh pants placed for dressing.    All needle, sponge, and instrument counts were correct at the end of the case.    The patient tolerated the procedure well and was  transferred to the postanesthesia care unit in stable condition.    Greyson Macedo M.D.  Colorectal & General Surgery  [facility]    [address], [address]  [place], [place], [address]  P: [phone]  F: [phone]

## 2025-02-19 NOTE — ANESTHESIA POSTPROCEDURE EVALUATION
Patient: Brandon Fang    Procedure Summary       Date: 02/19/25 Room / Location:  [facility] OR 2 /  [facility] OR    Anesthesia Start: 1125 Anesthesia Stop: 1156    Procedure: INCISION AND DRAINAGE OF PERIRECTAL ABSCESS (Perirectal) Diagnosis:       Perirectal abscess      (Perirectal abscess [K61.1])    Surgeons: Hero Macedo MD Provider: Garland Diane CRNA    Anesthesia Type: MAC, spinal ASA Status: 2            Anesthesia Type: MAC, spinal    Vitals  Vitals Value Taken Time   /65 02/19/25 1255   Temp 98.4 °F (36.9 °C) 02/19/25 1200   Pulse 80 02/19/25 1259   Resp 12 02/19/25 1235   SpO2 96 % 02/19/25 1259   Vitals shown include unfiled device data.        Post Anesthesia Care and Evaluation    Patient location during evaluation: PHASE II  Patient participation: complete - patient participated  Level of consciousness: awake  Pain management: adequate    Airway patency: patent  Anesthetic complications: No anesthetic complications  PONV Status: none  Cardiovascular status: acceptable  Respiratory status: acceptable  Hydration status: acceptable

## 2025-02-19 NOTE — PROGRESS NOTES
Colorectal & General Surgery  Consultation    Patient: Brandon Fang  YOB: 1995  MRN: [id number]      Assessment  Brandon Fang is a 29 y.o. male with anterior perirectal abscess.  We discussed proceeding the operating room for incision and drainage.  We also discussed the risk that he could have a fistula at this location which would require fistulotomy or seton placement.  We discussed the risk, benefits, alternatives to procedure.  Informed consent was obtained.  Plan to proceed the operating today for incision and drainage of perirectal abscess.    He is currently on Bactrim and we will continue that unless operative cultures indicate better sensitivity.    Referring Provider: ADRIANNE Garrison MD    Reason for Consultation: Perirectal abscess    History of Present Illness   Brandon Fang is a 29 y.o. male who presents to the office with a perirectal abscess.  He says been present since late last week.  He went to the urgent care and then to the emergency department and unfortunately did not receive incision and drainage at that time.  Today, he complains of significant pain.  No drainage.  He did wake up and sweats last night.    Most recent colonoscopy: None    Past Medical History   Past Medical History:   Diagnosis Date    History of MRSA infection         Past Surgical History   Past Surgical History:   Procedure Laterality Date    ABSCESS DRAINAGE N/A     left flank       Social History  Social History     Socioeconomic History    Marital status: Single   Tobacco Use    Smoking status: Former     Current packs/day: 0.20     Average packs/day: 0.2 packs/day for 5.0 years (1.0 ttl pk-yrs)     Types: Cigarettes     Passive exposure: Never    Smokeless tobacco: Former    Tobacco comments:     N/A   Vaping Use    Vaping status: Former    Devices: Pre-filled or refillable cartridge   Substance and Sexual Activity    Alcohol use: Yes     Comment: occassionally    Drug use: Never     Sexual activity: Not Currently       Family History  Family History   Problem Relation Age of Onset    Heart disease Mother        Colorectal cancer family history: None    Review of Systems  Negative except as documented in the HPI.     Allergies  Allergies   Allergen Reactions    Benadryl [Diphenhydramine] Other (See Comments)     Made him extremely hyper        Medications    Current Outpatient Medications:     Lactobacillus (ACIDOPHILUS PO), Take 1 tablet by mouth Daily., Disp: , Rfl:     Nitroglycerin 0.4 % ointment, Insert 1 Application into the rectum 2 (Two) Times a Day As Needed (hemorrhoid pain)., Disp: 30 g, Rfl: 0    sulfamethoxazole-trimethoprim (BACTRIM DS,SEPTRA DS) 800-160 MG per tablet, Take 1 tablet by mouth 2 (Two) Times a Day for 5 days., Disp: 10 tablet, Rfl: 0    Vital Signs  Vitals:    02/19/25 0812   BP: 122/88        Physical Exam  Constitutional: Resting comfortably, no acute distress  Neck: Supple, trachea midline  Respiratory: No increased work of breathing, Symmetric excursion  Cardiovascular: Well pefursed, no jugular venous distention evident   Abdominal:  Soft, non-tender, non-distended  Lymphatics: No cervical or suprascapular adenopathy  Skin: Warm, dry, no rash on visualized skin surfaces  Musculoskeletal: Symmetric strength, no obvious gross abnormalities  Psychiatric: Alert and oriented ×3, normal affect   Perirectal.  Perirectal abscess in the anterior position.         Greyson Macedo MD  Colorectal & General Surgery  [facility]    [address], [address]  [place], [place], [address]  P: [phone]  F: [phone]

## 2025-02-19 NOTE — DISCHARGE INSTRUCTIONS
Dr. Greyson Macedo  [address]  [place] [id number]  [phone]    Discharge Instructions for Anorectal Procedure    Go home, rest and take it easy today; however, you should get up and move about several times today to reduce the risk of developing a clot in your legs.      You may experience some dizziness or memory loss from the anesthesia.  This may last for the next 24 hours.  Someone should plan on staying with you for the first 24 hours for your safety.    Do not make any important legal decisions or sign any legal papers for the next 24 hours.      Eat and drink lightly today.  Start off with liquids, jello, soup, crackers or other bland foods at first. Please drink plenty of fluids. You may advance your diet tomorrow as tolerated as long as you do not experience any nausea or vomiting.     You should apply ice packs to the anal area today and begin your sitz baths tomorrow.    The best method of pain relief is a sitz bath (sitting in a tub or warm water) at least 3 times daily for 15 minutes.  This helps to reduce pain and aids with hygiene/drainage.  The drainage may have an unpleasant odor.  This is not unexpected and should be controlled with baths and showers.      If you have packing, remove the packing tomorrow.  You do not have to replace the packing once removed.  It will be critical to keep the area clean so take a sitz bath or a warm shower and allow soapy water to run over the perianal area after each bowel movement.    Bleeding and drainage are to be expected and may persist for as long as 2-4 weeks. Bleeding may occur with your bowel movements as well. Wear a cotton liner such as a Kotex pad or panty liner inside your underwear to protect your clothing.      Pain Medications  Alternate taking tylenol and ibuprofen at the doses below. If you have been instructed to not take either of those medicines due to another medical issue, please do not take them.  Tylenol 650 mg every 6  hours as needed for pain  Ibuprofen 600 mg every 6 hours as needed for pain  You have prescription strength pain medications available for you if you experience severe pain. Do not take the oxycodone and diazepam (Valium) simultaneously. Space them out by at least one hour.   Oxycodone 5 mg tablets. Take 1 to 2 tablets every 4 to 6 hours as needed for severe pain.   Diazepam (Valium) 5 mg tablets. Take 1 tablet every 8 hours as needed for severe anal pain or anal spasms. Do NOT take simultaneously with oxycodone.   You will not be totally pain free, but your pain medicine should make the pain tolerable.  Please take your pain medicine as prescribed and always take your pills with food to prevent nausea.  If you are having severe pain that cannot be controlled by the pain medicine, please contact me.    Remember that warm sitz baths are often very effective in controlling pain as well.    The goal is for your bowel movements to be soft which will help to minimize pain.  The pain medicine used to keep your comfortable may also cause some constipation so I recommend the following:    Metamucil powder 1 tablespoon in 8oz of water twice daily  Miralax daily as needed to produce one daily bowel movement   Contact me if the above does not result in soft bowel movements as you may need to add to the regimen.      No driving for 24 hours and for as long as you are taking your prescription pain medicine.  You may resume your activities gradually after 2 weeks. No heavy lifting (> 10 pounds) for 2 weeks.     You may return to work on the second day after surgery as you are able to tolerate.       The office will schedule you a follow up appointment. If you do not have one or do not hear from the office after one week, please call to schedule.     Remember to contact me for any of the following:    Fever > 101 degrees  Severe pain that cannot be controlled by taking your pain pills  Severe nausea or vomiting that cannot be  controlled by taking your nausea pills  Significant bleeding   Inability to urinate  Any other questions or concerns

## 2025-02-19 NOTE — ANESTHESIA PREPROCEDURE EVALUATION
Anesthesia Evaluation     Patient summary reviewed and Nursing notes reviewed   no history of anesthetic complications:   NPO Solid Status: > 8 hours  NPO Liquid Status: > 8 hours           Airway   Mallampati: II  TM distance: >3 FB  Neck ROM: full  No difficulty expected  Dental      Pulmonary     breath sounds clear to auscultation  (+) a smoker Former, cigarettes and vape,  Cardiovascular - negative cardio ROS  Exercise tolerance: good (4-7 METS)    Rhythm: regular  Rate: normal        Neuro/Psych- negative ROS  GI/Hepatic/Renal/Endo    (+) obesity, thyroid problem (not on meds) hypothyroidism    Musculoskeletal     Abdominal   (+) obese   Substance History   (+) alcohol use (social/rare)     OB/GYN          Other - negative ROS                   Anesthesia Plan    ASA 2     MAC and spinal     intravenous induction     Anesthetic plan, risks, benefits, and alternatives have been provided, discussed and informed consent has been obtained with: patient.    Use of blood products discussed with patient  Consented to blood products.      CODE STATUS:

## 2025-02-20 ENCOUNTER — TELEPHONE (OUTPATIENT)
Dept: SURGERY | Facility: CLINIC | Age: 30
End: 2025-02-20
Payer: COMMERCIAL

## 2025-02-20 NOTE — TELEPHONE ENCOUNTER
Patient left a voicemail requesting a return call for instructions on how to remove gauze.      S/P  Incision and drainage of perirectal abscess with fistulotomy  02/19/25

## 2025-02-20 NOTE — TELEPHONE ENCOUNTER
Returned call to patient.  He expressed concern about how to removing gauze packed in wound after surgery and minimizing the pain.  Patient has already has two pain pills at least 1 hour ago.  Advised patient he should get in the shower and allow warm water to run over the area for several minutes to help soften the gauze, he should then sit or lay down, and then remove the gauze.  He voiced understanding.

## 2025-02-21 ENCOUNTER — PATIENT ROUNDING (BHMG ONLY) (OUTPATIENT)
Dept: SURGERY | Facility: CLINIC | Age: 30
End: 2025-02-21
Payer: COMMERCIAL

## 2025-02-21 LAB
BACTERIA SPEC AEROBE CULT: ABNORMAL
GRAM STN SPEC: ABNORMAL
GRAM STN SPEC: ABNORMAL

## 2025-02-21 NOTE — PROGRESS NOTES
February 21, 2025        I am  with [facility]  [address]  [facility] [address] [phone].      I am calling to officially welcome you to our practice and ask about your recent visit. Is this a good time to talk? No. Left voicemail to call back.

## 2025-02-22 LAB — BACTERIA SPEC ANAEROBE CULT: ABNORMAL

## 2025-03-05 ENCOUNTER — OFFICE VISIT (OUTPATIENT)
Dept: SURGERY | Facility: CLINIC | Age: 30
End: 2025-03-05
Payer: COMMERCIAL

## 2025-03-05 VITALS
WEIGHT: 259 LBS | HEIGHT: 71 IN | DIASTOLIC BLOOD PRESSURE: 84 MMHG | BODY MASS INDEX: 36.26 KG/M2 | SYSTOLIC BLOOD PRESSURE: 118 MMHG

## 2025-03-05 DIAGNOSIS — K61.1 PERIRECTAL ABSCESS: Primary | ICD-10-CM

## 2025-03-05 PROCEDURE — 99024 POSTOP FOLLOW-UP VISIT: CPT | Performed by: SURGERY

## 2025-03-05 NOTE — PROGRESS NOTES
Colorectal & General Surgery  Post - Op    Patient: Brandon Fang  YOB: 1995  MRN: [id number]      Assessment  Brandon Fang is a 30 y.o. male with recent episode of perirectal abscess associated with fistula status post incision and drainage of peritoneal abscess with fistulotomy.  He is recovered very well.  He is healing nicely.  Symptoms continue to improve.  He is welcome to follow-up with me on an as-needed basis.  Anticipate will take another 2 or 3 months for his cavity to completely heal and.    History of Present Illness   Brandon Fang is a 30 y.o. male who presents in postoperative follow-up today.  He says his pain is resolved.  He still has small amount of drainage.  Otherwise, doing well.  No issues with incontinence.    Vital Signs  Vitals:    03/05/25 0956   BP: 118/84        Physical Exam  Constitutional: Resting comfortably, no acute distress  Neck: Supple, trachea midline  Respiratory: No increased work of breathing, Symmetric excursion  Cardiovascular: Well pefursed, no jugular venous distention evident   Abdominal:  Soft, non-tender, non-distended  Lymphatics: No cervical or suprascapular adenopathy  Skin: Warm, dry, no rash on visualized skin surfaces  Musculoskeletal: Symmetric strength, no obvious gross abnormalities  Psychiatric: Alert and oriented ×3, normal affect   Perianal: Well-healing abscess cavity.    Greyson Macedo MD  Colorectal & General Surgery  [facility]    [address], [address]  [place], [place], [address]  P: [phone]  F: [phone]

## 2025-06-18 ENCOUNTER — TELEPHONE (OUTPATIENT)
Dept: SURGERY | Facility: CLINIC | Age: 30
End: 2025-06-18
Payer: COMMERCIAL

## 2025-06-18 NOTE — TELEPHONE ENCOUNTER
Tied returning patient's phone call from his message left on the triage line. He stated that he had questions about his continued healing. I left a VM for him to return my call.

## 2025-06-19 ENCOUNTER — TELEPHONE (OUTPATIENT)
Dept: SURGERY | Facility: CLINIC | Age: 30
End: 2025-06-19
Payer: COMMERCIAL

## 2025-06-19 NOTE — TELEPHONE ENCOUNTER
Spoke with patient and advised that he no longer has any restrictions and he can shower normally. S/P ncision and drainage of perirectal abscess with fistulotomy on 02/19/25. He verbalized understanding.

## (undated) DEVICE — ANTIBACTERIAL UNDYED BRAIDED (POLYGLACTIN 910), SYNTHETIC ABSORBABLE SUTURE: Brand: COATED VICRYL

## (undated) DEVICE — PATIENT RETURN ELECTRODE, SINGLE-USE, CONTACT QUALITY MONITORING, ADULT, WITH 9FT CORD, FOR PATIENTS WEIGING OVER 33LBS. (15KG): Brand: MEGADYNE

## (undated) DEVICE — SUT GUT CHRM 3/0 SH 27IN G122H

## (undated) DEVICE — CULT AER/ANAEROB FASTIDIOUS BACT

## (undated) DEVICE — PREP SOL POVIDONE/IODINE BT 4OZ

## (undated) DEVICE — SPONGE,LAP,18"X18",DLX,XR,ST,5/PK,40/PK: Brand: MEDLINE

## (undated) DEVICE — SOL IRR H2O BO 1000ML STRL

## (undated) DEVICE — PANTY KNIT MATERN L/XL

## (undated) DEVICE — LOU MINOR PROCEDURE: Brand: MEDLINE INDUSTRIES, INC.

## (undated) DEVICE — SPNG GZ WOVN 4X4IN 12PLY 10/BX STRL

## (undated) DEVICE — DRSNG PAD ABD 8X10IN STRL

## (undated) DEVICE — NDL HYPO PRECISIONGLIDE REG 25G 1 1/2

## (undated) DEVICE — JELLY,LUBE,STERILE,FLIP TOP,TUBE,4-OZ: Brand: MEDLINE

## (undated) DEVICE — GLV SURG PREMIERPRO ORTHO LTX PF SZ7 BRN